# Patient Record
Sex: FEMALE | Race: WHITE | NOT HISPANIC OR LATINO | Employment: FULL TIME | URBAN - METROPOLITAN AREA
[De-identification: names, ages, dates, MRNs, and addresses within clinical notes are randomized per-mention and may not be internally consistent; named-entity substitution may affect disease eponyms.]

---

## 2019-08-22 ENCOUNTER — HOSPITAL ENCOUNTER (EMERGENCY)
Facility: HOSPITAL | Age: 49
Discharge: HOME | End: 2019-08-22
Attending: EMERGENCY MEDICINE | Admitting: EMERGENCY MEDICINE

## 2019-08-22 VITALS
WEIGHT: 125 LBS | OXYGEN SATURATION: 99 % | RESPIRATION RATE: 16 BRPM | DIASTOLIC BLOOD PRESSURE: 84 MMHG | HEIGHT: 63 IN | BODY MASS INDEX: 22.15 KG/M2 | TEMPERATURE: 98.9 F | SYSTOLIC BLOOD PRESSURE: 136 MMHG | HEART RATE: 90 BPM

## 2019-08-22 DIAGNOSIS — F33.1 MODERATE EPISODE OF RECURRENT MAJOR DEPRESSIVE DISORDER (HCC): Primary | ICD-10-CM

## 2019-08-22 LAB
AMPHET+METHAMPHET UR QL: NEGATIVE
BARBITURATES UR QL SCN: NEGATIVE
BENZODIAZ UR QL SCN: NEGATIVE
CANNABINOIDS SERPL QL: NEGATIVE
COCAINE UR QL: NEGATIVE
ETHANOL BLD-MCNC: <10 MG/DL (ref 0–10)
ETHANOL UR QL: <0.01 %
METHADONE UR QL SCN: NEGATIVE
OPIATES UR QL: NEGATIVE
OXYCODONE UR QL SCN: NEGATIVE

## 2019-08-22 PROCEDURE — 80307 DRUG TEST PRSMV CHEM ANLYZR: CPT | Performed by: EMERGENCY MEDICINE

## 2019-08-22 PROCEDURE — 90791 PSYCH DIAGNOSTIC EVALUATION: CPT | Performed by: MARRIAGE & FAMILY THERAPIST

## 2019-08-22 PROCEDURE — 99283 EMERGENCY DEPT VISIT LOW MDM: CPT

## 2019-08-22 RX ORDER — BUSPIRONE HYDROCHLORIDE 5 MG/1
5 TABLET ORAL 2 TIMES DAILY PRN
COMMUNITY

## 2019-08-22 RX ORDER — TRAZODONE HYDROCHLORIDE 50 MG/1
50 TABLET ORAL NIGHTLY
Qty: 5 TABLET | Refills: 0 | Status: SHIPPED | OUTPATIENT
Start: 2019-08-22

## 2019-08-22 RX ORDER — DESVENLAFAXINE SUCCINATE 50 MG/1
50 TABLET, EXTENDED RELEASE ORAL DAILY
COMMUNITY

## 2019-08-22 NOTE — ED TRIAGE NOTES
Pt brought to ER by daughter from DR Min office.  MD feels pt maybe experiencing manic episodes.  Per MD notes, pt has expressed thoughts that she would be better off dead but has not formulated a plan.

## 2019-08-22 NOTE — ED PROVIDER NOTES
EMERGENCY DEPARTMENT ENCOUNTER    Room Number:  41/41  Date seen:  8/22/2019  Time seen: 4:38 PM  PCP: Shaun Min DO  Historian: Tho      HPI:  Chief Complaint: Depression, anxiety   Context: Cailin Wright is a 49 y.o. female who presents to the ED from her PCP's office c/o anxiety and depression that has been ongoing for several months and has worsened in the past 1 week. Pt's PCP sent her to the ED for a psychartric evaluation. Pt c/o decreased appetite and difficulty sleeping. Pt states she started Pristiq about 1 week ago and think it is exacerbating her symptoms. She denies SI, HI, fever, CP, SOA, abd pain, and N/V/D.     Duration:  Several days  Onset: gradual  Timing: constant   Location: [facility]  Quality: depression, anxiety  Intensity/Severity: moderate  Progression: worsening   Associated Symptoms: decreased appetite, difficulty sleeping  Aggravating Factors: taking Prisitiq  Alleviating Factors: none  Previous Episodes: h/o anxiety ad depression  Treatment before arrival: pt saw her PCP today and was sent to ED for psych evaluation         ALLERGIES  Morphine    PAST MEDICAL HISTORY  Active Ambulatory Problems     Diagnosis Date Noted   • No Active Ambulatory Problems     Resolved Ambulatory Problems     Diagnosis Date Noted   • No Resolved Ambulatory Problems     Past Medical History:   Diagnosis Date   • Anxiety    • Depression    • Hyperlipidemia        PAST SURGICAL HISTORY  No past surgical history on file.    FAMILY HISTORY  No family history on file.    SOCIAL HISTORY  Social History     Socioeconomic History   • Marital status:      Spouse name: Not on file   • Number of children: Not on file   • Years of education: Not on file   • Highest education level: Not on file           REVIEW OF SYSTEMS  Review of Systems   Constitutional: Positive for appetite change (decreased). Negative for fever.   HENT: Negative for sore throat.    Eyes: Negative.    Respiratory: Negative for cough  and shortness of breath.    Cardiovascular: Negative for chest pain.   Gastrointestinal: Negative for abdominal pain, diarrhea and vomiting.   Genitourinary: Negative for dysuria.   Musculoskeletal: Negative for neck pain.   Skin: Negative for rash.   Allergic/Immunologic: Negative.    Neurological: Negative for weakness, numbness and headaches.   Hematological: Negative.    Psychiatric/Behavioral: Positive for dysphoric mood (depressed) and sleep disturbance (difficulty). Negative for suicidal ideas. The patient is nervous/anxious.    All other systems reviewed and are negative.          PHYSICAL EXAM  ED Triage Vitals [08/22/19 1608]   Temp Heart Rate Resp BP SpO2   98.9 °F (37.2 °C) 112 18 -- 98 %      Temp src Heart Rate Source Patient Position BP Location FiO2 (%)   Tympanic Monitor -- -- --     Physical Exam   Constitutional: She is oriented to person, place, and time. No distress.   HENT:   Head: Normocephalic and atraumatic.   Eyes: EOM are normal. Pupils are equal, round, and reactive to light.   Neck: Normal range of motion. Neck supple.   Cardiovascular: Normal rate, regular rhythm and normal heart sounds.   Pulmonary/Chest: Effort normal and breath sounds normal. No respiratory distress.   Abdominal: Soft. There is no tenderness. There is no rebound and no guarding.   Musculoskeletal: Normal range of motion. She exhibits no edema.   Neurological: She is alert and oriented to person, place, and time. She has normal sensation and normal strength.   Skin: Skin is warm and dry. No rash noted.   Psychiatric: Mood normal. She expresses no homicidal and no suicidal ideation. She has a flat affect.   Nursing note and vitals reviewed.          LAB RESULTS  Recent Results (from the past 24 hour(s))   Urine Drug Screen - Urine, Clean Catch    Collection Time: 08/22/19  4:52 PM   Result Value Ref Range    Amphet/Methamphet, Screen Negative Negative    Barbiturates Screen, Urine Negative Negative    Benzodiazepine  "Screen, Urine Negative Negative    Cocaine Screen, Urine Negative Negative    Opiate Screen Negative Negative    THC, Screen, Urine Negative Negative    Methadone Screen, Urine Negative Negative    Oxycodone Screen, Urine Negative Negative   Ethanol    Collection Time: 08/22/19  4:52 PM   Result Value Ref Range    Ethanol <10 0 - 10 mg/dL    Ethanol % <0.010 %       I ordered the above labs and reviewed the results.      MEDICATIONS GIVEN IN ER  Medications - No data to display        PROCEDURES  Procedures          PROGRESS AND CONSULTS   4:28 PM   Labs ordered and consult placed to Access.     6:07 PM  Reviewed pt's history and workup with Dr. Morris (ER physician).  After a bedside evaluation, Dr. Morris agrees with the plan of care.    8:00 PM   See Dr Morris's note for disposition.         Disposition vitals:  BP (!) 147/101   Pulse 112   Temp 98.9 °F (37.2 °C) (Tympanic)   Resp 18   Ht 160 cm (63\")   Wt 56.7 kg (125 lb)   SpO2 98%   Breastfeeding? No   BMI 22.14 kg/m²           DIAGNOSIS              Final diagnoses:   Moderate episode of recurrent major depressive disorder (CMS/[facility])                 Documentation assistance provided by christine East for NANCY Mcrae.  Information recorded by the christine was done at my direction and has been verified and validated by me.         Camryn East  08/22/19 [id number]       Shanna Love APRN  08/28/19 1202    "

## 2019-08-22 NOTE — ED PROVIDER NOTES
"Pt is a 49 y.o. female who presents to the ED from her PCP complaining of behavior changes and worsening depression that started one week ago. Pt was recently placed on pristiq. Pt states she has not been sleeping or eating due to no desire. In regards to SI, pt states \"I would not do it but I would not care if it happened.\" Daughter states pt has been praying more but states God has abandoned her. Pt denies hallucinations or prior psychiatric admissions. Pt states there are guns in her house in a safe. Pt does not smoke or just EtOH. Family at bedside.       On exam,  Constitutional: mild distress  HENT: oropharynx is normal  Neck: no thyromegaly   Cardiovascular: RRR, no murmur  Pulmonary: CTAB  Abdomen: soft, nontender, nondistended, normal active bowel sounds  Psych: Depressed affect.  She is not suicidal or homicidal.  She denies any auditory or visual hallucinations.      Plan: UDS and ethanol-negative, ACCESS to consult    2101- Discussed pt case with Mallory (ED) who states pt does not meet criteria for inpatient admission but has agreed to start IOP at the [facility] due to their evening option.        DISPOSITION  Final diagnoses:   Moderate episode of recurrent major depressive disorder (CMS/HCC)     DISCHARGE    Patient discharged in stable condition.    Reviewed implications of results, diagnosis, meds, responsibility to follow up, warning signs and symptoms of possible worsening, potential complications and reasons to return to ER.    Patient/Family voiced understanding of above instructions.    Discussed plan for discharge, as there is no emergent indication for admission. Patient referred to primary care provider for BP management due to today's BP. Pt/family is agreeable and understands need for follow up and repeat testing.  Pt is aware that discharge does not mean that nothing is wrong but it indicates no emergency is present that requires admission and they must continue care with follow-up as given " below or physician of their choice.     FOLLOW-UP  Shaun Min DO  [address]  [address]  [place] [address]  [phone]    Schedule an appointment as soon as possible for a visit            Medication List      New Prescriptions    traZODone 50 MG tablet  Commonly known as:  DESYREL  Take 1 tablet by mouth Every Night.                  MD ATTESTATION NOTE    The LIVE and I have discussed this patient's history, physical exam, and treatment plan.  I have reviewed the documentation and personally had a face to face interaction with the patient. I affirm the documentation and agree with the treatment and plan.  The attached note describes my personal findings.      Documentation assistance provided by christine Lawson for MD Jamaal. Information recorded by the scribe was done at my direction and has been verified and validated by me.             Lidnsey Lawson  08/22/19 [id number]       Eliseo Morris MD  08/22/19 [id number]

## 2019-08-23 NOTE — CONSULTS
"[facility] Consult.    Upon approach, found Pt standing in room, pacing, with dtr (Paloma) sitting at bedside. Pt interviewed alone initially. All information per Pt report, unless otherwise noted.    Pt is a 48y/o M/W/F. She has 4 children - dtrs ages 25 & 23 and two sons, ages 17 & 16 (on Monday). Pt employed as a pre-.     Pt has a hx of depression and has been on medication x 11 yrs. Pt,  and dtr (Paloma) all reported that Pt's sxs seemed to worsen around [place]. 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 \"I came home and the house was empty.\" Pt's two dtrs both moved out last summer and her two spend most of their time out with friends. Pt's biggest identity has been as a mother and all the children being gone, seemingly all at once, has catapulted Pt into an empty nest syndrome.  reported that Pt tends to project and catastrophize the future, wondering what it will be like in a year or two. Pt agreed. Dtrr and  report Pt's sxs worsening over the past few days.     Pt reports a similar episode approx 11 yrs ago and she was started on Lexapro at that time. She took that until July. Pt was then started on Trintellix. Pt uncertain if that really worked for her or not. Due to the cost, she was switched to Pristiq in the past week. No hx of inpt psych stays, no hx of outpt therapy. Pt does have an appt with a psychologist, Dr. Solorio the first week of September.      Pt's affect was flat. She reported difficulties sleeping, only getting 2-3 hrs per night. Pt reports anhedonia and decreased appetite. She has lost 13 lbs in approx 6 weeks. With 10 being worst, pt rated her depression and anxiety both \"10\". No substance use.    No SI/HI or hallucinations. Pt does wish for death. She stated though that she wants to feel better. Pt has positive support system and utilizes it. She talks with her mother, good friends & dtr. 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      Recommended IOP for Pt. She would prefer an afternoon program and chose [facility] due to it being close to her home. Discussed with ER MD, Dr. Morris who confirmed Pt disposition. Dr. Morris considering giving Pt small dose of Trazadone to help with sleep.     Pt was given contact information for [facility] and plans to go there tomorrow morning for assessment.   With Pt in room, Pt's  was directed to remove firearms from their home as a precaution.  was initially resistant to that, stating that guns (shotgun type) and ammunition are separate and that Pt doesn't know how to shoot a gun. This writer reiterated this as a safety precaution to avoid any access for any impulsivity.

## 2019-08-23 NOTE — DISCHARGE INSTRUCTIONS
Follow the suggestions given tonight by our therapist.  Use the trazodone as needed for sleep.  Please return to the emergency department if symptoms worsen with thoughts of wanting to hurt yourself.